# Patient Record
Sex: MALE | Race: WHITE | NOT HISPANIC OR LATINO | Employment: FULL TIME | URBAN - METROPOLITAN AREA
[De-identification: names, ages, dates, MRNs, and addresses within clinical notes are randomized per-mention and may not be internally consistent; named-entity substitution may affect disease eponyms.]

---

## 2022-05-02 PROBLEM — I10 ESSENTIAL HYPERTENSION: Status: ACTIVE | Noted: 2022-05-02

## 2022-05-02 PROBLEM — I47.10 SVT (SUPRAVENTRICULAR TACHYCARDIA): Status: ACTIVE | Noted: 2022-05-02

## 2023-07-27 ENCOUNTER — TELEPHONE (OUTPATIENT)
Dept: GASTROENTEROLOGY | Facility: CLINIC | Age: 48
End: 2023-07-27
Payer: COMMERCIAL

## 2023-07-27 NOTE — TELEPHONE ENCOUNTER
----- Message from Marry Glynn LPN sent at 7/27/2023 12:48 PM CDT -----  Contact: Pt    ----- Message -----  From: Madalyn Ramirez  Sent: 7/27/2023  12:42 PM CDT  To: [facility] Staff    Type:  Needs Medical Advice    Who Called: Pt  Would the patient rather a call back or a response via [facility]? call  Best Call Back Number: [phone]  Additional Information: Pt's Mother is a pt of Dr. Page, and pt would like to be scheduled for an appt. He would like to schedule a colonoscopy. Please call pt back to advise.

## 2023-08-01 ENCOUNTER — HOSPITAL ENCOUNTER (OUTPATIENT)
Dept: RADIOLOGY | Facility: HOSPITAL | Age: 48
Discharge: HOME | End: 2023-08-01
Attending: NURSE PRACTITIONER
Payer: COMMERCIAL

## 2023-08-01 ENCOUNTER — OFFICE VISIT (OUTPATIENT)
Dept: GASTROENTEROLOGY | Facility: CLINIC | Age: 48
End: 2023-08-01
Payer: COMMERCIAL

## 2023-08-01 VITALS — HEIGHT: 72 IN | BODY MASS INDEX: 25.26 KG/M2 | WEIGHT: 186.5 LBS

## 2023-08-01 DIAGNOSIS — R10.9 ABDOMINAL CRAMPING: ICD-10-CM

## 2023-08-01 DIAGNOSIS — K59.00 CONSTIPATION, UNSPECIFIED CONSTIPATION TYPE: ICD-10-CM

## 2023-08-01 DIAGNOSIS — R19.4 CHANGE IN BOWEL HABITS: Primary | ICD-10-CM

## 2023-08-01 PROCEDURE — 74019 RADEX ABDOMEN 2 VIEWS: CPT | Mod: TC,FY,PO

## 2023-08-01 PROCEDURE — 99999 PR PBB SHADOW E&M-EST. PATIENT-LVL III: ICD-10-PCS | Mod: PBBFAC,,, | Performed by: NURSE PRACTITIONER

## 2023-08-01 PROCEDURE — 99204 OFFICE O/P NEW MOD 45 MIN: CPT | Mod: S$GLB,,, | Performed by: NURSE PRACTITIONER

## 2023-08-01 PROCEDURE — 99999 PR PBB SHADOW E&M-EST. PATIENT-LVL III: CPT | Mod: PBBFAC,,, | Performed by: NURSE PRACTITIONER

## 2023-08-01 PROCEDURE — 74019 RADEX ABDOMEN 2 VIEWS: CPT | Mod: 26,,, | Performed by: RADIOLOGY

## 2023-08-01 PROCEDURE — 74019 XR ABDOMEN FLAT AND ERECT: ICD-10-PCS | Mod: 26,,, | Performed by: RADIOLOGY

## 2023-08-01 PROCEDURE — 99204 PR OFFICE/OUTPT VISIT, NEW, LEVL IV, 45-59 MIN: ICD-10-PCS | Mod: S$GLB,,, | Performed by: NURSE PRACTITIONER

## 2023-08-01 NOTE — PROGRESS NOTES
Subjective:       Patient ID: Andrew Uriarte is a 47 y.o. male, Body mass index is 25.3 kg/m².    Chief Complaint: Constipation      Patient is new to me.    Constipation  This is a new problem. The current episode started 1 to 4 weeks ago. The problem has been gradually improving since onset. His stool frequency is 1 time per day. The stool is described as firm, formed and loose (describes stool as type 4 (skinny) or 6 on bristol scale; denies bloody stools). The patient is on a high fiber diet. He Exercises regularly. There has Not been adequate water intake. Associated symptoms include abdominal pain (to left of umbilicus; describes as cramping; improving). Pertinent negatives include no anorexia, bloating, diarrhea, difficulty urinating, fecal incontinence, fever, flatus, hematochezia, melena, nausea, rectal pain, vomiting or weight loss. Risk factors include recent dehydration. He has tried laxatives and enemas (Past: Enema x1; Miralax; Linzess 290 mcg once daily for one week) for the symptoms. The treatment provided moderate relief. There is no history of abdominal surgery, endocrine disease, inflammatory bowel disease or irritable bowel syndrome.     Review of Systems   Constitutional:  Negative for appetite change, fever, unexpected weight change and weight loss.   HENT:  Negative for trouble swallowing.    Respiratory:  Negative for cough and shortness of breath.    Cardiovascular:  Negative for chest pain.   Gastrointestinal:  Positive for abdominal pain (to left of umbilicus; describes as cramping; improving) and constipation. Negative for abdominal distention, anal bleeding, anorexia, bloating, blood in stool, diarrhea, flatus, hematochezia, melena, nausea, rectal pain and vomiting.   Genitourinary:  Negative for difficulty urinating and dysuria.   Musculoskeletal:  Negative for gait problem.   Skin:  Negative for rash.   Neurological:  Negative for speech difficulty.   Psychiatric/Behavioral:  Negative  for confusion.        Past Medical History:   Diagnosis Date    Palpitations 9/9/2015      History reviewed. No pertinent surgical history.   Family History   Problem Relation Age of Onset    Heart disease Mother     Breast cancer Mother     No Known Problems Father     Colon cancer Maternal Grandfather         ?      Wt Readings from Last 10 Encounters:   08/01/23 84.6 kg (186 lb 8.2 oz)   05/02/22 84.4 kg (186 lb)   12/28/21 83.5 kg (184 lb)   05/13/16 99.3 kg (219 lb)   11/13/15 99.3 kg (219 lb)   10/06/15 103 kg (227 lb)   09/09/15 99.3 kg (219 lb)   09/01/15 99.8 kg (220 lb)   07/21/15 98.4 kg (217 lb)     Lab Results   Component Value Date    WBC 5.6 10/12/2015    HGB 15.8 10/12/2015    HCT 45.8 10/12/2015    MCV 85.6 10/12/2015     10/12/2015     CMP  Sodium   Date Value Ref Range Status   10/12/2015 143 137 - 145 MMOL/L Final     Potassium   Date Value Ref Range Status   10/12/2015 4.5 3.5 - 5.1 MMOL/L Final     Chloride   Date Value Ref Range Status   10/12/2015 100 98 - 107 MMOL/L Final     CO2   Date Value Ref Range Status   10/12/2015 29 22 - 31 MMOL/L Final     Glucose   Date Value Ref Range Status   10/12/2015 99 70 - 99 MG/DL Final     BUN   Date Value Ref Range Status   10/12/2015 15 9 - 21 MG/DL Final     Creatinine   Date Value Ref Range Status   10/12/2015 1.03 0.66 - 1.25 MG/DL Final     Calcium   Date Value Ref Range Status   10/12/2015 9.6 8.4 - 10.2 MG/DL Final     Total Protein   Date Value Ref Range Status   10/12/2015 8.0 6.3 - 8.2 G/DL Final     Albumin   Date Value Ref Range Status   10/12/2015 4.6 3.5 - 5.0 G/DL Final     Alkaline Phosphatase   Date Value Ref Range Status   10/12/2015 58 38 - 145 U/L Final     AST   Date Value Ref Range Status   10/12/2015 31 17 - 59 U/L Final     ALT   Date Value Ref Range Status   10/12/2015 46 7 - 56 U/L Final     Anion Gap   Date Value Ref Range Status   10/12/2015 14 (H) 5 - 12 MMOL/L Final            Reviewed prior medical records including  endoscopy history (see surgical history).     Objective:      Physical Exam  Constitutional:       General: He is not in acute distress.     Appearance: He is well-developed.   HENT:      Head: Normocephalic.      Right Ear: Hearing normal.      Left Ear: Hearing normal.      Nose: Nose normal.      Mouth/Throat:      Mouth: No oral lesions.      Pharynx: Uvula midline.   Eyes:      General: Lids are normal.      Conjunctiva/sclera: Conjunctivae normal.      Pupils: Pupils are equal, round, and reactive to light.   Neck:      Trachea: Trachea normal.   Cardiovascular:      Rate and Rhythm: Normal rate and regular rhythm.      Heart sounds: Normal heart sounds. No murmur heard.  Pulmonary:      Effort: Pulmonary effort is normal. No respiratory distress.      Breath sounds: Normal breath sounds. No stridor. No wheezing.   Abdominal:      General: Bowel sounds are normal. There is no distension.      Palpations: Abdomen is soft. There is no mass.      Tenderness: There is generalized abdominal tenderness (mild). There is no guarding or rebound.   Musculoskeletal:         General: Normal range of motion.      Cervical back: Normal range of motion.   Skin:     General: Skin is warm and dry.      Findings: No rash.      Comments: Non jaundiced   Neurological:      Mental Status: He is alert and oriented to person, place, and time.   Psychiatric:         Speech: Speech normal.         Behavior: Behavior normal. Behavior is cooperative.           Assessment:       1. Change in bowel habits    2. Constipation, unspecified constipation type    3. Abdominal cramping           Plan:   All diagnoses and orders for this visit:    Change in bowel habits, Constipation, unspecified constipation type & Abdominal cramping  - X-Ray Abdomen Flat And Erect; Future; Expected date: 08/01/2023  - Recommend daily exercise as tolerated, adequate water intake, and high fiber diet.   - Recommend OTC fiber supplement (Benefiber)  - Recommend  OTC stool softener   - Recommend OTC MiraLax once daily (17g PO) as directed  - Schedule Colonoscopy     If no improvement in symptoms or symptoms worsen, call/follow-up at clinic or go to ER

## 2023-09-10 ENCOUNTER — NURSE TRIAGE (OUTPATIENT)
Dept: ADMINISTRATIVE | Facility: CLINIC | Age: 48
End: 2023-09-10
Payer: COMMERCIAL

## 2023-09-10 NOTE — TELEPHONE ENCOUNTER
Colonoscopy scheduled for 9/12. State he was given an packet with his instructions but cannot find it. While I was attempting to locate information in chart, he located packet in glove box of his truck. It is all OTC. Read through while on phone with me, no questions or concerns. Advised to call back for any further questions or concerns. Verb understanding.   Reason for Disposition   Bowel prep for colonoscopy, questions about    Protocols used: Colonoscopy Symptoms and [id number]